# Patient Record
Sex: MALE | HISPANIC OR LATINO | URBAN - METROPOLITAN AREA
[De-identification: names, ages, dates, MRNs, and addresses within clinical notes are randomized per-mention and may not be internally consistent; named-entity substitution may affect disease eponyms.]

---

## 2019-04-10 ENCOUNTER — EMERGENCY (EMERGENCY)
Facility: HOSPITAL | Age: 24
LOS: 1 days | Discharge: HOME | End: 2019-04-10
Attending: EMERGENCY MEDICINE | Admitting: EMERGENCY MEDICINE
Payer: MEDICAID

## 2019-04-10 VITALS
RESPIRATION RATE: 18 BRPM | SYSTOLIC BLOOD PRESSURE: 111 MMHG | HEART RATE: 63 BPM | DIASTOLIC BLOOD PRESSURE: 73 MMHG | OXYGEN SATURATION: 100 % | TEMPERATURE: 99 F

## 2019-04-10 VITALS — WEIGHT: 199.96 LBS | HEIGHT: 67 IN
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
## 2019-04-10 DIAGNOSIS — S66.222A LACERATION OF EXTENSOR MUSCLE, FASCIA AND TENDON OF LEFT THUMB AT WRIST AND HAND LEVEL, INITIAL ENCOUNTER: ICD-10-CM
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
## 2019-04-10 PROCEDURE — 99285 EMERGENCY DEPT VISIT HI MDM: CPT

## 2019-04-10 PROCEDURE — 73140 X-RAY EXAM OF FINGER(S): CPT | Mod: 26,LT

## 2019-04-10 RX ORDER — CEFAZOLIN SODIUM 1 G
1000 VIAL (EA) INJECTION ONCE
Qty: 0 | Refills: 0 | Status: DISCONTINUED | OUTPATIENT
Start: 2019-04-10 | End: 2019-04-10

## 2019-04-10 RX ORDER — OXYCODONE HYDROCHLORIDE 5 MG/1
5 TABLET ORAL ONCE
Qty: 0 | Refills: 0 | Status: DISCONTINUED | OUTPATIENT
Start: 2019-04-10 | End: 2019-04-10

## 2019-04-10 RX ORDER — CEPHALEXIN 500 MG
1 CAPSULE ORAL
Qty: 21 | Refills: 0 | OUTPATIENT
Start: 2019-04-10 | End: 2019-04-16

## 2019-04-10 RX ORDER — CEFAZOLIN SODIUM 1 G
1000 VIAL (EA) INJECTION ONCE
Qty: 0 | Refills: 0 | Status: COMPLETED | OUTPATIENT
Start: 2019-04-10 | End: 2019-04-10

## 2019-04-10 RX ORDER — TETANUS TOXOID, REDUCED DIPHTHERIA TOXOID AND ACELLULAR PERTUSSIS VACCINE, ADSORBED 5; 2.5; 8; 8; 2.5 [IU]/.5ML; [IU]/.5ML; UG/.5ML; UG/.5ML; UG/.5ML
0.5 SUSPENSION INTRAMUSCULAR ONCE
Qty: 0 | Refills: 0 | Status: COMPLETED | OUTPATIENT
Start: 2019-04-10 | End: 2019-04-10

## 2019-04-10 RX ADMIN — Medication 1000 MILLIGRAM(S): at 12:55

## 2019-04-10 RX ADMIN — TETANUS TOXOID, REDUCED DIPHTHERIA TOXOID AND ACELLULAR PERTUSSIS VACCINE, ADSORBED 0.5 MILLILITER(S): 5; 2.5; 8; 8; 2.5 SUSPENSION INTRAMUSCULAR at 11:41

## 2019-04-10 RX ADMIN — OXYCODONE HYDROCHLORIDE 5 MILLIGRAM(S): 5 TABLET ORAL at 12:17

## 2019-04-10 NOTE — ED STATDOCS - PROGRESS NOTE DETAILS
XRs negative, spoke with hand Dr. Sales he will come see pt  Rebecca Duran PA-C XRs negative, spoke with hand Dr. Sales he will come see pt, will give dose of ancef, d/c home with augmentin  Rebecca Duran PA-C Spoke with ortho resident, who advised that resident who will be repairing lac is in the OR and it may be a few hours, pt made aware  Rebecca Duran PA-C Laceration and tendon repaired by Dr. Sales and ortho resident, would like to place pt on keflex, have him f/u in office in 1 week.  Wound care and S&S of infection discussed plan to d/c home with outpt f/u with hand, pt agreeable to d/c and plan of care, all questions answered  Rebecca Duran PA-C

## 2019-04-10 NOTE — ED STATDOCS - ATTENDING CONTRIBUTION TO CARE
I, James Rivera DO,  performed the initial face to face bedside interview with this patient regarding history of present illness, review of symptoms and relevant past medical, social and family history.  I completed an independent physical examination.  I was the initial provider who evaluated this patient. I have signed out the follow up of any pending tests (i.e. labs, radiological studies) to the ACP.  I have communicated the patient’s plan of care and disposition with the ACP.

## 2019-04-10 NOTE — CONSULT NOTE ADULT - ASSESSMENT
A/P: 24 M with Thumb Lac and EPL tendon injury.  -Pain control  -Tetanus given in ED  -Antibiotics  -Irrigated in ED at 1215  -Hand resident will repair tendon/lac in the ED.  -Discussed with Dr. Sales

## 2019-04-10 NOTE — CONSULT NOTE ADULT - SUBJECTIVE AND OBJECTIVE BOX
Orthopaedic Surgery Consult Note    Pt is a R-hand dominant 24y Male presenting with L thumb pain and laceration s/p injury today at home while cutting wood. Pt endorses decreased sensation at distal tip of thumb. Pt reports he has been unable to "move his thumb like normal" since the injury. Pt denies any other orthopaedic injuries at this time. Pt denies taking blood thinners. Pt last ate this morning. Pt reports he received all his childhood vaccines but does not recall when his last tetanus shot was. Denies fevers, dizziness, CP, SOB, N/V, calf pain. Pt is [place] speaking. Pt offered translation service, but decline. Interview/exam conducted by orthopaedic resident who speaks [place].    PMHx:  Denies    PSHx:  Denies    Allergies:  No Known Allergies    Social: Denies tobacco, EtOH, or illicit drug use.    Medications:   ceFAZolin   IVPB 1000 milliGRAM(s) IV Intermittent once  oxyCODONE    IR 5 milliGRAM(s) Oral Once PRN    Imaging:  XR L Hand: Negative for fracture of dislocation.    Vitals:  T(C): 36.7 (04-10-19 @ 10:47), Max: 36.7 (04-10-19 @ 10:47)  HR: 135 (04-10-19 @ 10:47) (135 - 135)  BP: 143/88 (04-10-19 @ 10:47) (143/88 - 143/88)  RR: 17 (04-10-19 @ 10:47) (17 - 17)  SpO2: 98% (04-10-19 @ 10:47) (98% - 98%)    Physical Exam:  Gen: NAD, AAOx3    LUE:   2 cm deep laceration overlying thumb IP joint  Unable to visualize EPL  Unable to extend thumb at IP Joint  No bony TTP at Shoulder/Elbow/Hand/Fingers  Non-tender with PROM Fingers  +AIN/PIN/Med/Rad/Uln/Msc/Ax  Decreased sensation at distal tip of thumb  SILT C5-T1  + Rad/Uln Pulse  Brisk cap refill  Compartments soft and compressible

## 2019-04-10 NOTE — ED STATDOCS - CARE PLAN
Principal Discharge DX:	Laceration of finger of left hand with tendon involvement, initial encounter

## 2019-04-10 NOTE — ED STATDOCS - NS_ ATTENDINGSCRIBEDETAILS _ED_A_ED_FT
James Rivera DO (Attending): The history, relevant review of systems, past medical and surgical history, medical decision making, and physical examination was documented by the scribe in my presence and I attest to the accuracy of the documentation.

## 2019-04-10 NOTE — ED STATDOCS - OBJECTIVE STATEMENT
25 y/o male with no significant PMHx presents to the ED c/o lacerations. As per family, pt was doing work at home when he cut himself with a machine. +left thumb laceration. Pt right handed. No other complaints at this time.

## 2019-04-10 NOTE — ED STATDOCS - CLINICAL SUMMARY MEDICAL DECISION MAKING FREE TEXT BOX
Pt with deep laceration to left thumb with concern of extensor tendon injury and fracture. Plan for XR, Tdap, abx, hand surgery consult. Pt with deep laceration to left thumb with concern of extensor tendon injury and fracture. Plan for XR, Tdap, abx, hand surgery consult.  Repaired by Dr. Sales, plan to d/c home with abx hand f/u.

## 2019-04-10 NOTE — ED ADULT TRIAGE NOTE - CCCP TRG CHIEF CMPLNT
ED / Discharge Outreach Protocol    Patient Contact    Attempt # 1    Was call answered?  No.  Left message on voicemail with information to call me back.    Angela Henderson RN       lacerations

## 2019-04-10 NOTE — ED STATDOCS - SKIN, MLM
skin normal color for race, warm, dry and intact. Deep laceration V shaped obliquely oriented over dorsum left thumb centered over IPJ. Appears to have disprutpion of extensor tendon.
